# Patient Record
Sex: FEMALE
[De-identification: names, ages, dates, MRNs, and addresses within clinical notes are randomized per-mention and may not be internally consistent; named-entity substitution may affect disease eponyms.]

---

## 2021-08-24 ENCOUNTER — HOSPITAL ENCOUNTER (OUTPATIENT)
Dept: HOSPITAL 61 - PF | Age: 61
End: 2021-08-24
Attending: FAMILY MEDICINE
Payer: COMMERCIAL

## 2021-08-24 DIAGNOSIS — J43.9: ICD-10-CM

## 2021-08-24 DIAGNOSIS — J98.4: ICD-10-CM

## 2021-08-24 DIAGNOSIS — Z02.71: Primary | ICD-10-CM

## 2021-08-24 DIAGNOSIS — R91.1: ICD-10-CM

## 2021-08-24 PROCEDURE — 71046 X-RAY EXAM CHEST 2 VIEWS: CPT

## 2021-08-24 PROCEDURE — 94060 EVALUATION OF WHEEZING: CPT

## 2021-08-24 PROCEDURE — 94640 AIRWAY INHALATION TREATMENT: CPT

## 2021-08-24 NOTE — RAD
EXAM: Chest, 2 views.



HISTORY: COPD.



COMPARISON: None.



FINDINGS: 2 views of the chest are obtained. There is hyperinflation due to emphysema. There are 
indira appearing interstitial changes. There is linear right middle lobe scarring. There is a small nodu
lar opacity overlying the right mid thorax which may be due to callus formation surrounding a healing
 or healed rib fracture. The heart is normal in size. There is no pleural effusion or pneumothorax.



IMPRESSION: 

1. Emphysema with superimposed chronic interstitial changes, including right middle lobe linear scarr
ing. There is no acute infiltrate.

2. Small nodular opacity overlying the right mid thorax possibly due to callus formation surrounding 
a healed or healing rib fracture. Short-term radiographic follow-up can be performed to exclude a non
calcified nodule in this location.



Electronically signed by: Rubi Jalloh MD (8/24/2021 12:33 PM) [id number]